# Patient Record
Sex: MALE | Race: WHITE | NOT HISPANIC OR LATINO | Employment: FULL TIME | URBAN - NONMETROPOLITAN AREA
[De-identification: names, ages, dates, MRNs, and addresses within clinical notes are randomized per-mention and may not be internally consistent; named-entity substitution may affect disease eponyms.]

---

## 2022-07-05 DIAGNOSIS — R10.31 RIGHT LOWER QUADRANT PAIN: ICD-10-CM

## 2022-07-07 ENCOUNTER — HOSPITAL ENCOUNTER (OUTPATIENT)
Dept: ULTRASOUND IMAGING | Facility: HOSPITAL | Age: 32
Discharge: HOME | End: 2022-07-07
Payer: COMMERCIAL

## 2022-07-07 DIAGNOSIS — R10.31 RIGHT LOWER QUADRANT PAIN: ICD-10-CM

## 2022-07-07 PROCEDURE — 76705 ECHO EXAM OF ABDOMEN: CPT

## 2022-12-14 RX ORDER — ACETAMINOPHEN 500 MG
1000 TABLET ORAL EVERY 6 HOURS PRN
COMMUNITY
End: 2022-12-19

## 2022-12-14 NOTE — PRE-PROCEDURE INSTRUCTIONS
Pre-Surgery Instructions:   Medication Instructions   • acetaminophen (TYLENOL) 500 mg tablet Uses PRN- OK to take day of surgery   See above

## 2022-12-18 ENCOUNTER — ANESTHESIA EVENT (OUTPATIENT)
Dept: PERIOP | Facility: HOSPITAL | Age: 32
End: 2022-12-18

## 2022-12-19 ENCOUNTER — ANESTHESIA (OUTPATIENT)
Dept: PERIOP | Facility: HOSPITAL | Age: 32
End: 2022-12-19

## 2022-12-19 ENCOUNTER — HOSPITAL ENCOUNTER (OUTPATIENT)
Facility: HOSPITAL | Age: 32
Setting detail: OUTPATIENT SURGERY
Discharge: HOME | End: 2022-12-19
Attending: SURGERY | Admitting: SURGERY

## 2022-12-19 VITALS
HEIGHT: 72 IN | WEIGHT: 216 LBS | DIASTOLIC BLOOD PRESSURE: 57 MMHG | TEMPERATURE: 97 F | HEART RATE: 88 BPM | RESPIRATION RATE: 18 BRPM | SYSTOLIC BLOOD PRESSURE: 109 MMHG | BODY MASS INDEX: 29.26 KG/M2 | OXYGEN SATURATION: 97 %

## 2022-12-19 DIAGNOSIS — K40.90 UNILATERAL INGUINAL HERNIA, WITHOUT OBSTRUCTION OR GANGRENE, NOT SPECIFIED AS RECURRENT: ICD-10-CM

## 2022-12-19 DEVICE — 3DMAX™ MID ANATOMICAL MESH, LARGE, RIGHT, 4” X 6”, 10 X 16 CM
Type: IMPLANTABLE DEVICE | Site: INGUINAL | Status: FUNCTIONAL
Brand: 3DMAX™ MID ANATOMICAL MESH

## 2022-12-19 RX ORDER — KETOROLAC TROMETHAMINE 30 MG/ML
INJECTION, SOLUTION INTRAMUSCULAR; INTRAVENOUS AS NEEDED
Status: DISCONTINUED | OUTPATIENT
Start: 2022-12-19 | End: 2022-12-19

## 2022-12-19 RX ORDER — MAGNESIUM HYDROXIDE 1200 MG/15ML
LIQUID ORAL AS NEEDED
Status: DISCONTINUED | OUTPATIENT
Start: 2022-12-19 | End: 2022-12-19 | Stop reason: HOSPADM

## 2022-12-19 RX ORDER — FENTANYL CITRATE 50 UG/ML
INJECTION, SOLUTION INTRAMUSCULAR; INTRAVENOUS AS NEEDED
Status: DISCONTINUED | OUTPATIENT
Start: 2022-12-19 | End: 2022-12-19

## 2022-12-19 RX ORDER — LIDOCAINE HYDROCHLORIDE 10 MG/ML
INJECTION, SOLUTION EPIDURAL; INFILTRATION; INTRACAUDAL; PERINEURAL AS NEEDED
Status: DISCONTINUED | OUTPATIENT
Start: 2022-12-19 | End: 2022-12-19

## 2022-12-19 RX ORDER — DEXMEDETOMIDINE HYDROCHLORIDE 100 UG/ML
INJECTION, SOLUTION INTRAVENOUS AS NEEDED
Status: DISCONTINUED | OUTPATIENT
Start: 2022-12-19 | End: 2022-12-19

## 2022-12-19 RX ORDER — PROPOFOL 10 MG/ML
INJECTION, EMULSION INTRAVENOUS AS NEEDED
Status: DISCONTINUED | OUTPATIENT
Start: 2022-12-19 | End: 2022-12-19

## 2022-12-19 RX ORDER — CEFAZOLIN SODIUM 2 G/50ML
2000 SOLUTION INTRAVENOUS
Status: COMPLETED | OUTPATIENT
Start: 2022-12-19 | End: 2022-12-19

## 2022-12-19 RX ORDER — HYDROMORPHONE HCL/PF 1 MG/ML
0.5 SYRINGE (ML) INJECTION
Status: DISCONTINUED | OUTPATIENT
Start: 2022-12-19 | End: 2022-12-19 | Stop reason: HOSPADM

## 2022-12-19 RX ORDER — FENTANYL CITRATE/PF 50 MCG/ML
50 SYRINGE (ML) INJECTION
Status: DISCONTINUED | OUTPATIENT
Start: 2022-12-19 | End: 2022-12-19 | Stop reason: HOSPADM

## 2022-12-19 RX ORDER — OXYCODONE HYDROCHLORIDE AND ACETAMINOPHEN 5; 325 MG/1; MG/1
1 TABLET ORAL EVERY 4 HOURS PRN
Qty: 10 TABLET | Refills: 0 | Status: SHIPPED | OUTPATIENT
Start: 2022-12-19 | End: 2022-12-29

## 2022-12-19 RX ORDER — SODIUM CHLORIDE, SODIUM LACTATE, POTASSIUM CHLORIDE, CALCIUM CHLORIDE 600; 310; 30; 20 MG/100ML; MG/100ML; MG/100ML; MG/100ML
125 INJECTION, SOLUTION INTRAVENOUS CONTINUOUS
Status: DISCONTINUED | OUTPATIENT
Start: 2022-12-19 | End: 2022-12-19 | Stop reason: HOSPADM

## 2022-12-19 RX ORDER — MIDAZOLAM HYDROCHLORIDE 2 MG/2ML
INJECTION, SOLUTION INTRAMUSCULAR; INTRAVENOUS AS NEEDED
Status: DISCONTINUED | OUTPATIENT
Start: 2022-12-19 | End: 2022-12-19

## 2022-12-19 RX ORDER — OXYCODONE HYDROCHLORIDE AND ACETAMINOPHEN 5; 325 MG/1; MG/1
1 TABLET ORAL EVERY 4 HOURS PRN
Status: COMPLETED | OUTPATIENT
Start: 2022-12-19 | End: 2022-12-19

## 2022-12-19 RX ORDER — MEPERIDINE HYDROCHLORIDE 25 MG/ML
25 INJECTION INTRAMUSCULAR; INTRAVENOUS; SUBCUTANEOUS ONCE AS NEEDED
Status: DISCONTINUED | OUTPATIENT
Start: 2022-12-19 | End: 2022-12-19 | Stop reason: HOSPADM

## 2022-12-19 RX ORDER — ROCURONIUM BROMIDE 10 MG/ML
INJECTION, SOLUTION INTRAVENOUS AS NEEDED
Status: DISCONTINUED | OUTPATIENT
Start: 2022-12-19 | End: 2022-12-19

## 2022-12-19 RX ORDER — BUPIVACAINE HYDROCHLORIDE AND EPINEPHRINE 2.5; 5 MG/ML; UG/ML
INJECTION, SOLUTION EPIDURAL; INFILTRATION; INTRACAUDAL; PERINEURAL AS NEEDED
Status: DISCONTINUED | OUTPATIENT
Start: 2022-12-19 | End: 2022-12-19 | Stop reason: HOSPADM

## 2022-12-19 RX ORDER — DEXAMETHASONE SODIUM PHOSPHATE 10 MG/ML
INJECTION, SOLUTION INTRAMUSCULAR; INTRAVENOUS AS NEEDED
Status: DISCONTINUED | OUTPATIENT
Start: 2022-12-19 | End: 2022-12-19

## 2022-12-19 RX ORDER — LIDOCAINE HYDROCHLORIDE 10 MG/ML
0.5 INJECTION, SOLUTION EPIDURAL; INFILTRATION; INTRACAUDAL; PERINEURAL ONCE AS NEEDED
Status: DISCONTINUED | OUTPATIENT
Start: 2022-12-19 | End: 2022-12-19 | Stop reason: HOSPADM

## 2022-12-19 RX ORDER — METOCLOPRAMIDE HYDROCHLORIDE 5 MG/ML
10 INJECTION INTRAMUSCULAR; INTRAVENOUS ONCE AS NEEDED
Status: DISCONTINUED | OUTPATIENT
Start: 2022-12-19 | End: 2022-12-19 | Stop reason: HOSPADM

## 2022-12-19 RX ORDER — ONDANSETRON 2 MG/ML
INJECTION INTRAMUSCULAR; INTRAVENOUS AS NEEDED
Status: DISCONTINUED | OUTPATIENT
Start: 2022-12-19 | End: 2022-12-19

## 2022-12-19 RX ADMIN — FENTANYL CITRATE 50 MCG: 50 INJECTION INTRAMUSCULAR; INTRAVENOUS at 09:26

## 2022-12-19 RX ADMIN — ROCURONIUM BROMIDE 40 MG: 10 INJECTION, SOLUTION INTRAVENOUS at 09:01

## 2022-12-19 RX ADMIN — LIDOCAINE HYDROCHLORIDE 50 MG: 10 INJECTION, SOLUTION EPIDURAL; INFILTRATION; INTRACAUDAL; PERINEURAL at 09:01

## 2022-12-19 RX ADMIN — SUGAMMADEX 200 MG: 100 INJECTION, SOLUTION INTRAVENOUS at 10:57

## 2022-12-19 RX ADMIN — FENTANYL CITRATE 25 MCG: 50 INJECTION INTRAMUSCULAR; INTRAVENOUS at 10:17

## 2022-12-19 RX ADMIN — SODIUM CHLORIDE, SODIUM LACTATE, POTASSIUM CHLORIDE, AND CALCIUM CHLORIDE: .6; .31; .03; .02 INJECTION, SOLUTION INTRAVENOUS at 08:45

## 2022-12-19 RX ADMIN — CEFAZOLIN SODIUM 2000 MG: 2 SOLUTION INTRAVENOUS at 08:58

## 2022-12-19 RX ADMIN — OXYCODONE HYDROCHLORIDE AND ACETAMINOPHEN 1 TABLET: 5; 325 TABLET ORAL at 11:56

## 2022-12-19 RX ADMIN — ROCURONIUM BROMIDE 10 MG: 10 INJECTION, SOLUTION INTRAVENOUS at 09:10

## 2022-12-19 RX ADMIN — KETOROLAC TROMETHAMINE 30 MG: 30 INJECTION, SOLUTION INTRAMUSCULAR at 10:43

## 2022-12-19 RX ADMIN — SODIUM CHLORIDE, SODIUM LACTATE, POTASSIUM CHLORIDE, AND CALCIUM CHLORIDE: .6; .31; .03; .02 INJECTION, SOLUTION INTRAVENOUS at 10:57

## 2022-12-19 RX ADMIN — MIDAZOLAM 2 MG: 1 INJECTION INTRAMUSCULAR; INTRAVENOUS at 08:58

## 2022-12-19 RX ADMIN — FENTANYL CITRATE 50 MCG: 50 INJECTION INTRAMUSCULAR; INTRAVENOUS at 10:55

## 2022-12-19 RX ADMIN — PROPOFOL 200 MG: 10 INJECTION, EMULSION INTRAVENOUS at 09:01

## 2022-12-19 RX ADMIN — DEXAMETHASONE SODIUM PHOSPHATE 8 MG: 10 INJECTION, SOLUTION INTRAMUSCULAR; INTRAVENOUS at 09:10

## 2022-12-19 RX ADMIN — DEXMEDETOMIDINE HCL 20 MCG: 100 INJECTION INTRAVENOUS at 09:45

## 2022-12-19 RX ADMIN — FENTANYL CITRATE 50 MCG: 50 INJECTION INTRAMUSCULAR; INTRAVENOUS at 09:01

## 2022-12-19 RX ADMIN — ONDANSETRON 4 MG: 2 INJECTION INTRAMUSCULAR; INTRAVENOUS at 10:43

## 2022-12-19 RX ADMIN — ROCURONIUM BROMIDE 10 MG: 10 INJECTION, SOLUTION INTRAVENOUS at 10:17

## 2022-12-19 RX ADMIN — FENTANYL CITRATE 25 MCG: 50 INJECTION INTRAMUSCULAR; INTRAVENOUS at 09:33

## 2022-12-19 RX ADMIN — DEXMEDETOMIDINE HCL 20 MCG: 100 INJECTION INTRAVENOUS at 09:28

## 2022-12-19 RX ADMIN — ROCURONIUM BROMIDE 20 MG: 10 INJECTION, SOLUTION INTRAVENOUS at 09:29

## 2022-12-19 NOTE — ADDENDUM NOTE
Addendum  created 12/19/22 1150 by Krista Pantoja MD    Order list changed, Pharmacy for encounter modified

## 2022-12-19 NOTE — ANESTHESIA PREPROCEDURE EVALUATION
Procedure:  ROBOTIC ASSISTED LAPAROSCOPIC INGUINAL HERNIA REPAIR WITH MESH (Right: Groin)    Relevant Problems   ANESTHESIA (within normal limits)      CARDIO (within normal limits)      ENDO (within normal limits)      PULMONARY (within normal limits)        Physical Exam    Airway    Mallampati score: I  TM Distance: >3 FB  Neck ROM: full     Dental   No notable dental hx     Cardiovascular      Pulmonary      Other Findings        Anesthesia Plan  ASA Score- 1     Anesthesia Type- general with ASA Monitors  Additional Monitors:   Airway Plan: ETT  Plan Factors-Exercise tolerance (METS): >4 METS  Chart reviewed  Patient summary reviewed  Patient is not a current smoker  Induction- intravenous  Postoperative Plan- Plan for postoperative opioid use  Informed Consent- Anesthetic plan and risks discussed with patient  I personally reviewed this patient with the CRNA  Discussed and agreed on the Anesthesia Plan with the CRNA  Roanna Schlatter

## 2022-12-19 NOTE — H&P
H&P Exam - Caio Moore 27 y o  male MRN: [id number]    Unit/Bed#: OR POOL Encounter: [id number]    Assessment:  Right inguinal hernia    Plan: Will proceed with robot assisted right inguinal hernia, possible open possible bilateral today in the OR  Risks and benefits of the procedure were explained in detail  Risks include but not limited to pain, bleeding, scarring, infection, damage to testicular structures, chronic pain, numbness, seroma, hematoma, recurrence  All questions answered and informed consent was obtained  NPO  IV abx on call    History of Present Illness   Caio Moore is a 27 y o  male with right inguinal hernia for the past 6 months  This presented as a bulge in his right groin after lifting an air conditioner this summer  He complains of pain when he moves and strains  He never tries to reduce the bulge  He denies any nausea, vomiting, difficulty urinating  No prior surgeries  Review of Systems   All other systems reviewed and are negative        Historical Information   Past Medical History:   Diagnosis Date   • Inguinal hernia     right side     Past Surgical History:   Procedure Laterality Date   • TOOTH EXTRACTION      back molar per pt     Social History   Social History     Substance and Sexual Activity   Alcohol Use Yes    Comment: rarely     Social History     Substance and Sexual Activity   Drug Use Never     Social History     Tobacco Use   Smoking Status Never   Smokeless Tobacco Never     E-Cigarette Use: Never User     E-Cigarette/Vaping Substances       Family History: non-contributory    Meds/Allergies   all medications and allergies reviewed  No Known Allergies    Objective   First Vitals:   Blood Pressure: 123/81 (12/19/22 0805)  Pulse: 68 (12/19/22 0805)  Temperature: 98 °F (36 7 °C) (12/19/22 0805)  Temp Source: Oral (12/19/22 0805)  Respirations: 18 (12/19/22 0805)  Height: 6' (182 9 cm) (12/14/22 1534)  Weight - Scale: 98 kg (216 lb) (12/14/22 1534)  SpO2: 97 % (12/19/22 0805)    Current Vitals:   Blood Pressure: 123/81 (12/19/22 0805)  Pulse: 68 (12/19/22 0805)  Temperature: 98 °F (36 7 °C) (12/19/22 0805)  Temp Source: Oral (12/19/22 0805)  Respirations: 18 (12/19/22 0805)  Height: 6' (182 9 cm) (12/19/22 0805)  Weight - Scale: 98 kg (216 lb) (12/19/22 0805)  SpO2: 97 % (12/19/22 0805)    No intake or output data in the 24 hours ending 12/19/22 0848    Invasive Devices     Peripheral Intravenous Line  Duration           Peripheral IV 12/19/22 Dorsal (posterior); Left Hand <1 day                Physical Exam  Vitals and nursing note reviewed  HENT:      Head: Normocephalic  Eyes:      Conjunctiva/sclera: Conjunctivae normal       Pupils: Pupils are equal, round, and reactive to light  Cardiovascular:      Rate and Rhythm: Normal rate and regular rhythm  Pulmonary:      Effort: Pulmonary effort is normal    Abdominal:      General: Abdomen is flat  Bowel sounds are normal       Palpations: Abdomen is soft  Genitourinary:     Comments: Right inguinal hernia, present with valsalva  Reducible, non tender  Left groin without obvious hernia  Musculoskeletal:         General: Normal range of motion  Cervical back: Normal range of motion  Skin:     General: Skin is warm and dry  Capillary Refill: Capillary refill takes less than 2 seconds  Neurological:      General: No focal deficit present  Mental Status: He is alert           Lab Results: none  Imaging: none  EKG, Pathology, and Other Studies: none    Code Status: No Order  Advance Directive and Living Will:      Power of :    POLST:      Counseling / Coordination of Care:   None     [facility]

## 2022-12-19 NOTE — DISCHARGE INSTR - AVS FIRST PAGE
Discharge Date:  12/19/2022  Procedure:  Procedure(s):  ROBOTIC ASSISTED LAPAROSCOPIC INGUINAL HERNIA REPAIR WITH MESH  Surgeon:  Jamar Cobian, DO    Please contact Dr Abbey Gee office at [phone] with any concerns or questions  The information below provides you with the instructions and the list of medications you need to be taking following discharge from the hospital   If you have any questions, please ask before leaving  Please carry this letter with you when you see your doctor in the clinic  If you have questions, you can reach us at the numbers above        Follow Up Appointments:  If not listed below, and one has not already been made for you, call the general surgery office at [phone]  Activity:  No lifting pushing or pulling greater than 10 lbs for 2 weeks post op or until instructed otherwise by provider at your post op visit  No lifting greater than 20 lbs until you are 6 weeks post op  Diet:  Regular diet as tolerated  Incision:   Your incisions are closed with absorbable suture and covered with surgical glue  The glue will fall off over the next couple of weeks  You may shower tomorrow but do not scrub the incisions  Allow warm soapy water to run over them  Do not submerge in water until after your follow up visit  Pain Control: You should apply ice for 20 minutes on then 20 minutes off to your incision(s) for pain relief for the first 72 hours after surgery  Ice will decrease the immediate postoperative inflammation  After the first 72 hours you should switch to a heating pad in the same time increments  The heat will continue to allow the swelling to go down and decrease your pain  Motrin/Ibuprofen: You may take Motrin (Ibuprofen) up to 800 mg every 8 hours as needed  If your pain is not as severe you can use less than 800mg (each over the counter tablet is 200mg)  The maximum daily dose of Ibuprofen is 3200mg   Avoid other NSAID medications while taking Ibuprofen  Do NOT take Motrin/Ibuprofen or any other NSAID drugs while taking Toradol, or if you are on Coumadin or any other blood thinner, or were instructed to avoid ibuprofen previously  Constipation: It is normal not to have a bowel movement for up to 3 days after surgery due to anesthesia  To prevent constipation, drink plenty of liquids and eat plenty of fiber which includes fruits & vegetables  After surgery for the regimen below for relief of constipation:    Recommend the following daily bowel regimen for constipation in order from 1 to 3:  1  Colace 100 mg capsules two-three times daily  2  Miralax 1 packet or capful (17 grams) in [facility] water daily to twice daily  3  Dulcolax suppository once daily as needed for constipation  If loose stools occur discontinue use of medications in reverse order (3,2,1)  Driving:  Don't drive until you are off narcotics for one full week and can walk normally and firmly apply the brake without pain  Date you may return to work or school: Will be discussed at your postop visit  Special Instructions:    Call if you have chills or a fever of greater than or equal to 100 5 degrees, any uncontrolled nausea, vomiting, bleeding, pain, diarrhea, constipation or shortness of breath  Regarding Anesthesia and Analgesia:  Do not drink alcoholic beverages, including beer, for 24 hours or while taking pain medication  Alcohol enhances the effects of anesthesia and sedation  Do not drive a motor vehicle, operate machinery or power tools for 24 hours  If a child, no bicycle riding, skateboards, gym sets, etc  For 24 hours  Do not make any important decisions or sign important papers for 24 hours  You may experience lightheadedness, dizziness and sleepiness following surgery/procedure  Please DO NOT STAY ALONE  A responsible adult should be with you for this 24 hour period  Rest at home with moderate activity as tolerated    It may be necessary to go to bed, however, it is important to rest for 24 hours  Progress slowly to a regular diet unless your physician has instructed you otherwise  Start with liquids such as soft drinks, then soup and crackers, gradually working up to solid foods  Certain anesthetics, pain medications and/or surgical procedures may produce nausea and vomitting in certain individuals  If nausea becomes a problem at home, call your physician  In the meantime, rest or sleep on our side to avoid accidentally inhaling material that you may vomit  Patients receiving general anesthesia may experience sore throat and general muscle aches, chest soreness  Should this occur, we recommend rest and liquids  These symptoms should disappear in approximately 24 hours  Patients having spinal anesthesia, we recommend drinking a lot of liquids containing caffeine, such as coffee, tea, cola, chocolate  If any symptoms persist, notify your physician  Incentive Spirometer Instructions:  Due to your surgery, it may be too painful to take deep breaths  You may also feel too weak to take deep breaths  When you do not breathe deeply enough, this can lead to your lungs not being completely inflated which in turn leads to shortness of breath, fever, and can increase your risk of postoperative pneumonia  An incentive spirometer is a device used to help you keep your lungs healthy while they are healing  The incentive spirometer teaches you how to take slow deep breaths  By using the incentive spirometer every 1 - 2 hours, or as directed by your nurse or doctor, you can take an active role in your recovery and keep your lungs healthy  How to Use an Incentive Spirometer   Sit up and hold the incentive spirometer upright  Place the mouthpiece of the incentive spirometer into your mouth  Make sure you make a good seal with your lips  Breathe out (exhale) normally  Breathe in SLOWLY (Inhale slowly)     A piece in the incentive spirometer will rise as you take a breath in  Try to get this piece to rise as high as you can  Usually, there is a marker placed by your health care provider that tells you how big of a breath you should take  Hold your breath for a few (3 - 5) seconds, then slowly release your breath and exhale  Repeat 10 - 15 breaths every 1 - 2 hours  no

## 2022-12-19 NOTE — ANESTHESIA POSTPROCEDURE EVALUATION
Post-Op Assessment Note    CV Status:  Stable    Pain management: adequate     Mental Status:  Alert and awake   Hydration Status:  Euvolemic   PONV Controlled:  Controlled   Airway Patency:  Patent      Post Op Vitals Reviewed: Yes      Staff: CRNA         No notable events documented      /78 (12/19/22 1109)    Temp 97 9 °F (36 6 °C) (12/19/22 1109)    Pulse 77 (12/19/22 1109)   Resp 16 (12/19/22 1109)    SpO2   98%

## 2022-12-19 NOTE — OP NOTE
OPERATIVE REPORT  PATIENT NAME: Jacinta Plasencia    :  1990  MRN: [id number]  Pt Location: [facility] OR ROOM 01    SURGERY DATE: 2022    Surgeon(s) and Role:     * Lloyd Mercado DO - Primary     * Trell Galloway PA-C - Assisting    Preop Diagnosis:  Unilateral inguinal hernia, without obstruction or gangrene, not specified as recurrent [K40 90]    Post-Op Diagnosis Codes:     * Unilateral inguinal hernia, without obstruction or gangrene, not specified as recurrent [K40 90]    Procedure(s) (LRB):  ROBOTIC ASSISTED LAPAROSCOPIC INGUINAL HERNIA REPAIR WITH MESH (Right)    Specimen(s):  ID Type Source Tests Collected by Time Destination   1 : right lipoma of cord Tissue Lipoma of cord TISSUE EXAM Lloyd Mercado DO 2022 10:17 AM        Estimated Blood Loss:   15 mL    Drains:  * No LDAs found *    Anesthesia Type:   General    Operative Indications:  Unilateral inguinal hernia, without obstruction or gangrene, not specified as recurrent [K40 90]      Operative Findings: Moderate sized indirect Right inguinal hernia  Small cord lipoma    Complications:   None    Procedure and Technique:  The patient was taken to the operating room and correctly identified and the procedure was verified  They were placed in the supine position on the OR table and general anesthesia was administered  They received pre-operative antibiotics  The abdomen was prepped and draped in the usual sterile fashion  A time out was held and the above information was confirmed  The abdomen was insufflated with a Veress needle in the supraumbilical position  A 12 mm trocar was placed at this location  Additional 8 mm robotic trocars were placed in the right lateral and left lateral abdominal wall, 10 cm from the 12 mm trocar  The abdomen was examined and there appeared to be an indirect inguinal hernia on the right side  The left side exhibited no signs of a hernia    The [employer] robot was then docked according to [employer] specifications  A peritoneal flap was created above the right inguinal defect  This was carried medially down until Nate's ligament was identified  The dissection then moved lateral to the spermatic cord to develop a lateral pre-peritoneal plane  At this time the hernia sac was then carefully dissected and completely reduced, taking care not to injure the adjacent vessels and cord structures  A cord lipoma was identified and was dissected off the cord and removed from the abdomen  An adequately sized peritoneal flap was created  At this time the correct sided mesh, a 2-0 vicryl and 2-0 V-lock suture were placed in the abdomen  The mesh was then put into place, completely covering the hernia defect  This was sutured medially to Nate's ligament and superolaterally to the abdominal wall with the vicryl suture  The mesh lay flat without wrinkles when the peritoneum was pulled over it  The peritoneum was then completely closed with running 2-0 V-lock suture  The needles were removed from the abdomen and the robot was undocked  The 12 mm trocar site fascia was closed with 0 vicryl trans-fascial suture  The pneumoperitoneum was expelled and the trocars were removed  The skin was closed with 4-0 Monocryl  Surgical glue was applied  This marked the end of the procedure  All needle, instrument and sponge counts were correct times two  The patient awoke from anesthesia and was taken to the recovery room in stable condition          I was present for the entire procedure and A physician assistant was required during the procedure for retraction tissue handling,dissection and suturing    Patient Disposition:  PACU         SIGNATURE: Remi Little DO  DATE: December 19, 2022  TIME: 10:51 AM

## (undated) DEVICE — 40595 XL TRENDELENBURG POSITIONING KIT: Brand: 40595 XL TRENDELENBURG POSITIONING KIT

## (undated) DEVICE — PROGRASP FORCEPS: Brand: ENDOWRIST;DAVINCI SI

## (undated) DEVICE — SYRINGE 10ML LL

## (undated) DEVICE — TOWEL SURG XR DETECT GREEN STRL RFD

## (undated) DEVICE — CHLORAPREP HI-LITE 26ML ORANGE

## (undated) DEVICE — ENDOPATH PNEUMONEEDLE INSUFFLATION NEEDLES WITH LUER LOCK CONNECTORS 120MM: Brand: ENDOPATH

## (undated) DEVICE — TUBING INSUFFLATION SET ISO CONNECTOR

## (undated) DEVICE — ASTOUND FABRIC REINFORCED SURGICAL GOWN: Brand: CONVERTORS

## (undated) DEVICE — TRAY FOLEY 16FR URIMETER SILICONE SURESTEP

## (undated) DEVICE — Device

## (undated) DEVICE — SUT VLOC 90 2-0 GS-22 12 IN VLOCM2115

## (undated) DEVICE — TROCAR: Brand: KII FIOS FIRST ENTRY

## (undated) DEVICE — ALLENTOWN LAP CHOLE APP PACK: Brand: CARDINAL HEALTH

## (undated) DEVICE — DRAPE SHEET THREE QUARTER

## (undated) DEVICE — MEGASUTURECUT ND: Brand: ENDOWRIST;DAVINCI SI

## (undated) DEVICE — ROBOT INST CANNULA 8MM OBTURATOR BLUNT DISP

## (undated) DEVICE — ROBOT CAMERA DRAPE ARM

## (undated) DEVICE — VIAL DECANTER

## (undated) DEVICE — ADHESIVE SKIN HIGH VISCOSITY EXOFIN 1ML

## (undated) DEVICE — ROBOT DRAPE INST ARM DA VINCI SI

## (undated) DEVICE — SUT VICRYL 0 REEL 54 IN J287G

## (undated) DEVICE — PENCIL ELECTROSURG E-Z CLEAN -0035H

## (undated) DEVICE — LUBRICANT INST ELECTROLUBE ANTISTK WO PAD

## (undated) DEVICE — NEEDLE 25G X 1 1/2

## (undated) DEVICE — TIP COVER ACCESSORY

## (undated) DEVICE — VISUALIZATION SYSTEM: Brand: CLEARIFY

## (undated) DEVICE — DRAPE EQUIPMENT RF WAND

## (undated) DEVICE — GLOVE SRG BIOGEL 8

## (undated) DEVICE — SUT VICRYL 2-0 CT-2 27 IN J269H

## (undated) DEVICE — GLOVE INDICATOR PI UNDERGLOVE SZ 8.5 BLUE

## (undated) DEVICE — SCD SEQUENTIAL COMPRESSION COMFORT SLEEVE MEDIUM KNEE LENGTH: Brand: KENDALL SCD

## (undated) DEVICE — PAD GROUNDING ADULT

## (undated) DEVICE — CANNULA SEAL

## (undated) DEVICE — MONOPOLAR CURVED SCISSORS: Brand: ENDOWRIST;DAVINCI SI

## (undated) DEVICE — INTENDED FOR TISSUE SEPARATION, AND OTHER PROCEDURES THAT REQUIRE A SHARP SURGICAL BLADE TO PUNCTURE OR CUT.: Brand: BARD-PARKER SAFETY BLADES SIZE 11, STERILE

## (undated) DEVICE — ROBOT CAMERA DRAPE HEAD

## (undated) DEVICE — SUT MONOCRYL 4-0 PS-2 18 IN Y496G